# Patient Record
Sex: MALE | Race: WHITE | NOT HISPANIC OR LATINO | Employment: OTHER | URBAN - METROPOLITAN AREA
[De-identification: names, ages, dates, MRNs, and addresses within clinical notes are randomized per-mention and may not be internally consistent; named-entity substitution may affect disease eponyms.]

---

## 2021-03-15 ENCOUNTER — OFFICE VISIT (OUTPATIENT)
Dept: NEUROLOGY | Facility: CLINIC | Age: 71
End: 2021-03-15

## 2021-03-15 ENCOUNTER — LAB (OUTPATIENT)
Dept: LAB | Facility: HOSPITAL | Age: 71
End: 2021-03-15

## 2021-03-15 VITALS
WEIGHT: 242.2 LBS | DIASTOLIC BLOOD PRESSURE: 74 MMHG | SYSTOLIC BLOOD PRESSURE: 122 MMHG | HEART RATE: 78 BPM | OXYGEN SATURATION: 95 %

## 2021-03-15 DIAGNOSIS — E03.9 HYPOTHYROIDISM, UNSPECIFIED TYPE: ICD-10-CM

## 2021-03-15 DIAGNOSIS — E55.9 VITAMIN D DEFICIENCY: ICD-10-CM

## 2021-03-15 DIAGNOSIS — G20 PARKINSONISM, UNSPECIFIED PARKINSONISM TYPE (HCC): ICD-10-CM

## 2021-03-15 PROBLEM — F41.9 ANXIETY: Status: ACTIVE | Noted: 2021-03-15

## 2021-03-15 PROBLEM — J30.9 ALLERGIC RHINITIS: Status: ACTIVE | Noted: 2021-03-15

## 2021-03-15 PROBLEM — R25.1 TREMOR: Status: ACTIVE | Noted: 2021-03-15

## 2021-03-15 PROBLEM — M51.37 DEGENERATION OF LUMBAR/LUMBOSACRAL DISC WITHOUT MYELOPATHY: Status: ACTIVE | Noted: 2021-03-15

## 2021-03-15 PROBLEM — N40.0 BENIGN PROSTATIC HYPERPLASIA WITHOUT LOWER URINARY TRACT SYMPTOMS: Status: ACTIVE | Noted: 2021-03-15

## 2021-03-15 PROBLEM — G20.C PARKINSONISM: Status: ACTIVE | Noted: 2021-03-15

## 2021-03-15 LAB
25(OH)D3 SERPL-MCNC: 40.6 NG/ML (ref 30–100)
ALBUMIN SERPL-MCNC: 4 G/DL (ref 3.5–5.2)
ALBUMIN/GLOB SERPL: 1.5 G/DL
ALP SERPL-CCNC: 75 U/L (ref 39–117)
ALT SERPL W P-5'-P-CCNC: 16 U/L (ref 1–41)
AMMONIA BLD-SCNC: 26 UMOL/L (ref 16–60)
ANION GAP SERPL CALCULATED.3IONS-SCNC: 9.7 MMOL/L (ref 5–15)
AST SERPL-CCNC: 16 U/L (ref 1–40)
BASOPHILS # BLD AUTO: 0.03 10*3/MM3 (ref 0–0.2)
BASOPHILS NFR BLD AUTO: 0.6 % (ref 0–1.5)
BILIRUB SERPL-MCNC: 0.3 MG/DL (ref 0–1.2)
BUN SERPL-MCNC: 16 MG/DL (ref 8–23)
BUN/CREAT SERPL: 20 (ref 7–25)
CALCIUM SPEC-SCNC: 8.9 MG/DL (ref 8.6–10.5)
CHLORIDE SERPL-SCNC: 106 MMOL/L (ref 98–107)
CO2 SERPL-SCNC: 23.3 MMOL/L (ref 22–29)
CREAT SERPL-MCNC: 0.8 MG/DL (ref 0.76–1.27)
DEPRECATED RDW RBC AUTO: 43.3 FL (ref 37–54)
EOSINOPHIL # BLD AUTO: 0.23 10*3/MM3 (ref 0–0.4)
EOSINOPHIL NFR BLD AUTO: 4.7 % (ref 0.3–6.2)
ERYTHROCYTE [DISTWIDTH] IN BLOOD BY AUTOMATED COUNT: 13.8 % (ref 12.3–15.4)
ERYTHROCYTE [SEDIMENTATION RATE] IN BLOOD: 13 MM/HR (ref 0–20)
FOLATE SERPL-MCNC: 13.5 NG/ML (ref 4.78–24.2)
GFR SERPL CREATININE-BSD FRML MDRD: 95 ML/MIN/1.73
GLOBULIN UR ELPH-MCNC: 2.7 GM/DL
GLUCOSE SERPL-MCNC: 118 MG/DL (ref 65–99)
HCT VFR BLD AUTO: 40.6 % (ref 37.5–51)
HGB BLD-MCNC: 13.5 G/DL (ref 13–17.7)
IMM GRANULOCYTES # BLD AUTO: 0.01 10*3/MM3 (ref 0–0.05)
IMM GRANULOCYTES NFR BLD AUTO: 0.2 % (ref 0–0.5)
LYMPHOCYTES # BLD AUTO: 1.87 10*3/MM3 (ref 0.7–3.1)
LYMPHOCYTES NFR BLD AUTO: 38.4 % (ref 19.6–45.3)
MCH RBC QN AUTO: 28.8 PG (ref 26.6–33)
MCHC RBC AUTO-ENTMCNC: 33.3 G/DL (ref 31.5–35.7)
MCV RBC AUTO: 86.6 FL (ref 79–97)
MONOCYTES # BLD AUTO: 0.43 10*3/MM3 (ref 0.1–0.9)
MONOCYTES NFR BLD AUTO: 8.8 % (ref 5–12)
NEUTROPHILS NFR BLD AUTO: 2.3 10*3/MM3 (ref 1.7–7)
NEUTROPHILS NFR BLD AUTO: 47.3 % (ref 42.7–76)
NRBC BLD AUTO-RTO: 0 /100 WBC (ref 0–0.2)
PLATELET # BLD AUTO: 226 10*3/MM3 (ref 140–450)
PMV BLD AUTO: 9.2 FL (ref 6–12)
POTASSIUM SERPL-SCNC: 3.8 MMOL/L (ref 3.5–5.2)
PROT SERPL-MCNC: 6.7 G/DL (ref 6–8.5)
RBC # BLD AUTO: 4.69 10*6/MM3 (ref 4.14–5.8)
SODIUM SERPL-SCNC: 139 MMOL/L (ref 136–145)
TSH SERPL DL<=0.05 MIU/L-ACNC: 1.48 UIU/ML (ref 0.27–4.2)
VIT B12 BLD-MCNC: 567 PG/ML (ref 211–946)
WBC # BLD AUTO: 4.87 10*3/MM3 (ref 3.4–10.8)

## 2021-03-15 PROCEDURE — 82306 VITAMIN D 25 HYDROXY: CPT

## 2021-03-15 PROCEDURE — 36415 COLL VENOUS BLD VENIPUNCTURE: CPT

## 2021-03-15 PROCEDURE — 80053 COMPREHEN METABOLIC PANEL: CPT

## 2021-03-15 PROCEDURE — 82607 VITAMIN B-12: CPT

## 2021-03-15 PROCEDURE — 82140 ASSAY OF AMMONIA: CPT

## 2021-03-15 PROCEDURE — 86592 SYPHILIS TEST NON-TREP QUAL: CPT

## 2021-03-15 PROCEDURE — 84443 ASSAY THYROID STIM HORMONE: CPT

## 2021-03-15 PROCEDURE — 82390 ASSAY OF CERULOPLASMIN: CPT

## 2021-03-15 PROCEDURE — 99204 OFFICE O/P NEW MOD 45 MIN: CPT | Performed by: NURSE PRACTITIONER

## 2021-03-15 PROCEDURE — 85025 COMPLETE CBC W/AUTO DIFF WBC: CPT

## 2021-03-15 PROCEDURE — 86063 ANTISTREPTOLYSIN O SCREEN: CPT

## 2021-03-15 PROCEDURE — 85652 RBC SED RATE AUTOMATED: CPT

## 2021-03-15 PROCEDURE — 82746 ASSAY OF FOLIC ACID SERUM: CPT

## 2021-03-15 RX ORDER — METOPROLOL SUCCINATE 25 MG/1
TABLET, EXTENDED RELEASE ORAL
COMMUNITY
Start: 2021-01-23

## 2021-03-15 RX ORDER — ESCITALOPRAM OXALATE 10 MG/1
10 TABLET ORAL DAILY
COMMUNITY
Start: 2021-02-07

## 2021-03-15 RX ORDER — CALCIUM CARBONATE/VITAMIN D3 600 MG-10
TABLET ORAL
COMMUNITY

## 2021-03-15 RX ORDER — DOFETILIDE 0.5 MG/1
CAPSULE ORAL
COMMUNITY

## 2021-03-15 RX ORDER — LEVOTHYROXINE SODIUM 0.05 MG/1
50 TABLET ORAL DAILY
COMMUNITY
Start: 2021-03-01

## 2021-03-15 RX ORDER — CYANOCOBALAMIN 1000 UG/ML
INJECTION, SOLUTION INTRAMUSCULAR; SUBCUTANEOUS
COMMUNITY
Start: 2021-02-11

## 2021-03-15 RX ORDER — LANOLIN ALCOHOL/MO/W.PET/CERES
1000 CREAM (GRAM) TOPICAL DAILY
COMMUNITY
Start: 2021-02-11 | End: 2021-03-15

## 2021-03-15 RX ORDER — FEXOFENADINE HCL 180 MG/1
180 TABLET ORAL DAILY
COMMUNITY
Start: 2021-02-10

## 2021-03-15 RX ORDER — LOSARTAN POTASSIUM 100 MG/1
100 TABLET ORAL DAILY
COMMUNITY
Start: 2020-12-28

## 2021-03-15 RX ORDER — WARFARIN SODIUM 5 MG/1
TABLET ORAL
COMMUNITY
Start: 2021-03-01

## 2021-03-15 NOTE — PROGRESS NOTES
Subjective   Patient ID: Lalo Salazar is a 71 y.o. male     Chief Complaint   Patient presents with   • Tremors     NP        History of Present Illness  71 y.o. male referred by Dr. Henson for tremors.     Mild tremors for about 3-5 years, worsening over the past year.   Bilateral UE. Having difficulty with holding a cup of liquids, shaving, writing, and fine motor movements.     Denies difficulty rising from a chair, rolling over in bed, or vivid dreaming.     Forgetfulness, cant remember what tool he was going to get out of the shed.   Rembmers family members names, able to drive without getting lost, not leaving oven/stove on or water running. No trouble taking his medications.     MMSE 27 - did not know the date, can't spell word backwards and had trouble drawing the picture.    Has paroxysmal A-fib, has his Coumadin levels checked every 6 weeks. Recently changed administration instructions and is going every 2 weeks.     Medical Records reviewed:   OV 2/10/21: tremor worsening in hands at rest, can't hardly hold anything.     CBC - NCS     Past Medical History:   Diagnosis Date   • Atrial fibrillation (CMS/HCC)    • Bladder problem    • Bleeding disorder (CMS/HCC)    • History of angina      History reviewed. No pertinent family history.  Social History     Socioeconomic History   • Marital status:      Spouse name: Not on file   • Number of children: Not on file   • Years of education: Not on file   • Highest education level: Not on file   Tobacco Use   • Smoking status: Former Smoker   • Smokeless tobacco: Former User     Types: Chew     Quit date: 3/15/2005   • Tobacco comment: Quit 35+ years ago   Substance and Sexual Activity   • Alcohol use: Not Currently     Comment: Very occasional/rare glass of wine   • Drug use: Defer   • Sexual activity: Defer       Review of Systems    Objective     Vitals:    03/15/21 1417   BP: 122/74   Pulse: 78   SpO2: 95%   Weight: 110 kg (242 lb 3.2 oz)       Neurologic  Exam     Mental Status   Oriented to person, place, and time.   Disoriented to day.   Registration: recalls 3 of 3 objects. Recall at 5 minutes: recalls 3 of 3 objects. Follows 3 step commands.   Attention: normal. Concentration: normal.   Speech: speech is normal   Level of consciousness: alert  Knowledge: good and consistent with education.   Able to name object. Able to read. Able to repeat. Able to write. Normal comprehension.     Cranial Nerves     CN II   Visual fields full to confrontation.   Visual acuity: normal  Right visual field deficit: none  Left visual field deficit: none     CN III, IV, VI   Pupils are equal, round, and reactive to light.  Extraocular motions are normal.   Right pupil: Shape: regular. Reactivity: brisk. Consensual response: intact.   Left pupil: Shape: regular. Reactivity: brisk. Consensual response: intact.   Nystagmus: none   Diplopia: none  Ophthalmoparesis: none  Upgaze: normal  Downgaze: normal  Conjugate gaze: present  Vestibulo-ocular reflex: present    CN V   Facial sensation intact.   Right corneal reflex: normal  Left corneal reflex: normal    CN VII   Right facial weakness: none  Left facial weakness: none    CN VIII   Hearing: intact    CN IX, X   Palate: symmetric  Right gag reflex: normal  Left gag reflex: normal    CN XI   Right sternocleidomastoid strength: normal  Left sternocleidomastoid strength: normal    CN XII   Tongue: not atrophic  Fasciculations: absent  Tongue deviation: none    Motor Exam   Muscle bulk: normal  Overall muscle tone: normal  Right arm tone: normal  Left arm tone: normal  Right leg tone: normal  Left leg tone: normal    Strength   Strength 5/5 throughout.     Sensory Exam   Light touch normal.   Proprioception normal.     Gait, Coordination, and Reflexes     Coordination   Romberg: positive  Finger to nose coordination: normal  Tandem walking coordination: abnormal    Tremor   Resting tremor: present (BUE R > L )  Intention tremor:  absent  Action tremor: absent    Reflexes   Reflexes 2+ except as noted. Decreased R arm swing     No cogwheel or bradykinesia        Physical Exam  Eyes:      Extraocular Movements: EOM normal.      Pupils: Pupils are equal, round, and reactive to light.   Neurological:      Mental Status: He is oriented to person, place, and time.      Coordination: Romberg Test abnormal. Finger-Nose-Finger Test normal.      Gait: Tandem walk abnormal.      Deep Tendon Reflexes: Strength normal.   Psychiatric:         Speech: Speech normal.         No results found for any previous visit.         Assessment/Plan     Problem List Items Addressed This Visit        Endocrine and Metabolic    Hypothyroidism    Relevant Medications    levothyroxine (SYNTHROID, LEVOTHROID) 50 MCG tablet    metoprolol succinate XL (TOPROL-XL) 25 MG 24 hr tablet    Other Relevant Orders    TSH       Neuro    Parkinsonism (CMS/HCC)    Current Assessment & Plan     Ordered labs     Has a [facility] device, unsure if he can have MRI's - will bring his card to F/U     Many medications interact with Coumadin which limit medication options     Trial of Sinemet 25/100 PO BID     F/U in 6 weeks or sooner if needed          Relevant Medications    carbidopa-levodopa (Sinemet)  MG per tablet    Other Relevant Orders    Ammonia    CBC & Differential    Comprehensive Metabolic Panel    RPR    Sedimentation Rate    TSH    Vitamin B12 & Folate    Ceruloplasmin    Antistreptolysin O Screen    Vitamin D 25 Hydroxy      Other Visit Diagnoses     Vitamin D deficiency        Relevant Orders    Vitamin D 25 Hydroxy             Return in about 6 months (around 9/15/2021).

## 2021-03-15 NOTE — ASSESSMENT & PLAN NOTE
Ordered labs     Has a [facility] device, unsure if he can have MRI's - will bring his card to F/U     Many medications interact with Coumadin which limit medication options     Trial of Sinemet 25/100 PO BID     F/U in 6 weeks or sooner if needed

## 2021-03-16 LAB
ASO AB SERPL-ACNC: NEGATIVE [IU]/ML
CERULOPLASMIN SERPL-MCNC: 21 MG/DL (ref 16–31)
RPR SER QL: NORMAL

## 2021-04-30 ENCOUNTER — OFFICE VISIT (OUTPATIENT)
Dept: NEUROLOGY | Facility: CLINIC | Age: 71
End: 2021-04-30

## 2021-04-30 VITALS
BODY MASS INDEX: 28.34 KG/M2 | OXYGEN SATURATION: 97 % | SYSTOLIC BLOOD PRESSURE: 126 MMHG | WEIGHT: 240 LBS | HEIGHT: 77 IN | TEMPERATURE: 97.7 F | DIASTOLIC BLOOD PRESSURE: 70 MMHG | HEART RATE: 72 BPM

## 2021-04-30 DIAGNOSIS — G20 PARKINSONISM, UNSPECIFIED PARKINSONISM TYPE (HCC): Primary | ICD-10-CM

## 2021-04-30 PROCEDURE — 99213 OFFICE O/P EST LOW 20 MIN: CPT | Performed by: NURSE PRACTITIONER

## 2021-04-30 NOTE — PROGRESS NOTES
Subjective   Patient ID: Lalo Salazar is a 71 y.o. male     Chief Complaint   Patient presents with   • Tremors        History of Present Illness  71 y.o. male returns in follow up for Parkinsonism. At last appointment on 3/15/21 started Sinemet PO BID and ordered labs, MRI contraindicated with cardiac device.     Labs 3/15/21: CBC, CMP, Ammonia, RPR, Sed rate, TSH, Vitamin B12/Folate, ceruloplasmin, ASO, Vitamin D - NCS     Tremors improving during the morning time but worse in the afternoons, taking Sinemet PO BID. Denies side effects and dyskinesias.     Problem History:    Mild tremors for about 3-5 years, worsening over the past year.   Bilateral UE. Having difficulty with holding a cup of liquids, shaving, writing, and fine motor movements.     Denies difficulty rising from a chair, rolling over in bed, or vivid dreaming.     Forgetfulness, cant remember what tool he was going to get out of the shed.   Rembmers family members names, able to drive without getting lost, not leaving oven/stove on or water running. No trouble taking his medications.     MMSE 27 - did not know the date, can't spell word backwards and had trouble drawing the picture.    Has paroxysmal A-fib, has his Coumadin levels checked every 6 weeks. Recently changed administration instructions and is going every 2 weeks.     Medical Records reviewed:   OV 2/10/21: tremor worsening in hands at rest, can't hardly hold anything.     CBC - NCS     Past Medical History:   Diagnosis Date   • Atrial fibrillation (CMS/HCC)    • Bladder problem    • Bleeding disorder (CMS/HCC)    • History of angina      History reviewed. No pertinent family history.  Social History     Socioeconomic History   • Marital status:      Spouse name: Not on file   • Number of children: Not on file   • Years of education: Not on file   • Highest education level: Not on file   Tobacco Use   • Smoking status: Former Smoker   • Smokeless tobacco: Former User     Types:  "Chew     Quit date: 3/15/2005   • Tobacco comment: Quit 35+ years ago   Vaping Use   • Vaping Use: Never used   Substance and Sexual Activity   • Alcohol use: Not Currently     Comment: Very occasional/rare glass of wine   • Drug use: Defer   • Sexual activity: Defer       Review of Systems    Objective     Vitals:    04/30/21 1329   BP: 126/70   Pulse: 72   Temp: 97.7 °F (36.5 °C)   SpO2: 97%   Weight: 109 kg (240 lb)   Height: 195.6 cm (77\")       Neurologic Exam     Mental Status   Oriented to person, place, and time.   Disoriented to day.   Registration: recalls 3 of 3 objects. Recall at 5 minutes: recalls 3 of 3 objects. Follows 3 step commands.   Attention: normal. Concentration: normal.   Speech: speech is normal   Level of consciousness: alert  Knowledge: good and consistent with education.   Able to name object. Able to read. Able to repeat. Able to write. Normal comprehension.     Cranial Nerves     CN II   Visual fields full to confrontation.   Visual acuity: normal  Right visual field deficit: none  Left visual field deficit: none     CN III, IV, VI   Pupils are equal, round, and reactive to light.  Extraocular motions are normal.   Right pupil: Shape: regular. Reactivity: brisk. Consensual response: intact.   Left pupil: Shape: regular. Reactivity: brisk. Consensual response: intact.   Nystagmus: none   Diplopia: none  Ophthalmoparesis: none  Upgaze: normal  Downgaze: normal  Conjugate gaze: present  Vestibulo-ocular reflex: present    CN V   Facial sensation intact.   Right corneal reflex: normal  Left corneal reflex: normal    CN VII   Right facial weakness: none  Left facial weakness: none    CN VIII   Hearing: intact    CN IX, X   Palate: symmetric  Right gag reflex: normal  Left gag reflex: normal    CN XI   Right sternocleidomastoid strength: normal  Left sternocleidomastoid strength: normal    CN XII   Tongue: not atrophic  Fasciculations: absent  Tongue deviation: none    Motor Exam   Muscle " bulk: normal  Overall muscle tone: normal  Right arm tone: normal  Left arm tone: normal  Right leg tone: normal  Left leg tone: normal    Strength   Strength 5/5 throughout.     Sensory Exam   Light touch normal.   Proprioception normal.     Gait, Coordination, and Reflexes     Coordination   Romberg: positive  Finger to nose coordination: normal  Tandem walking coordination: abnormal    Tremor   Resting tremor: present (BUE R > L )  Intention tremor: absent  Action tremor: absent    Reflexes   Reflexes 2+ except as noted. Decreased R arm swing     No cogwheel or bradykinesia        Physical Exam  Eyes:      Extraocular Movements: EOM normal.      Pupils: Pupils are equal, round, and reactive to light.   Neurological:      Mental Status: He is oriented to person, place, and time.      Coordination: Romberg Test abnormal. Finger-Nose-Finger Test normal.      Gait: Tandem walk abnormal.      Deep Tendon Reflexes: Strength normal.   Psychiatric:         Speech: Speech normal.         Lab on 03/15/2021   Component Date Value Ref Range Status   • Ammonia 03/15/2021 26  16 - 60 umol/L Final   • Glucose 03/15/2021 118* 65 - 99 mg/dL Final   • BUN 03/15/2021 16  8 - 23 mg/dL Final   • Creatinine 03/15/2021 0.80  0.76 - 1.27 mg/dL Final   • Sodium 03/15/2021 139  136 - 145 mmol/L Final   • Potassium 03/15/2021 3.8  3.5 - 5.2 mmol/L Final   • Chloride 03/15/2021 106  98 - 107 mmol/L Final   • CO2 03/15/2021 23.3  22.0 - 29.0 mmol/L Final   • Calcium 03/15/2021 8.9  8.6 - 10.5 mg/dL Final   • Total Protein 03/15/2021 6.7  6.0 - 8.5 g/dL Final   • Albumin 03/15/2021 4.00  3.50 - 5.20 g/dL Final   • ALT (SGPT) 03/15/2021 16  1 - 41 U/L Final   • AST (SGOT) 03/15/2021 16  1 - 40 U/L Final   • Alkaline Phosphatase 03/15/2021 75  39 - 117 U/L Final   • Total Bilirubin 03/15/2021 0.3  0.0 - 1.2 mg/dL Final   • eGFR Non African Amer 03/15/2021 95  >60 mL/min/1.73 Final   • Globulin 03/15/2021 2.7  gm/dL Final   • A/G Ratio  03/15/2021 1.5  g/dL Final   • BUN/Creatinine Ratio 03/15/2021 20.0  7.0 - 25.0 Final   • Anion Gap 03/15/2021 9.7  5.0 - 15.0 mmol/L Final   • RPR 03/15/2021 Non-Reactive  Non-Reactive Final   • Sed Rate 03/15/2021 13  0 - 20 mm/hr Final   • TSH 03/15/2021 1.480  0.270 - 4.200 uIU/mL Final   • Folate 03/15/2021 13.50  4.78 - 24.20 ng/mL Final   • Vitamin B-12 03/15/2021 567  211 - 946 pg/mL Final   • Ceruloplasmin 03/15/2021 21  16 - 31 mg/dL Final   • ASO 03/15/2021 Negative  Negative Final   • 25 Hydroxy, Vitamin D 03/15/2021 40.6  30.0 - 100.0 ng/ml Final   • WBC 03/15/2021 4.87  3.40 - 10.80 10*3/mm3 Final   • RBC 03/15/2021 4.69  4.14 - 5.80 10*6/mm3 Final   • Hemoglobin 03/15/2021 13.5  13.0 - 17.7 g/dL Final   • Hematocrit 03/15/2021 40.6  37.5 - 51.0 % Final   • MCV 03/15/2021 86.6  79.0 - 97.0 fL Final   • MCH 03/15/2021 28.8  26.6 - 33.0 pg Final   • MCHC 03/15/2021 33.3  31.5 - 35.7 g/dL Final   • RDW 03/15/2021 13.8  12.3 - 15.4 % Final   • RDW-SD 03/15/2021 43.3  37.0 - 54.0 fl Final   • MPV 03/15/2021 9.2  6.0 - 12.0 fL Final   • Platelets 03/15/2021 226  140 - 450 10*3/mm3 Final   • Neutrophil % 03/15/2021 47.3  42.7 - 76.0 % Final   • Lymphocyte % 03/15/2021 38.4  19.6 - 45.3 % Final   • Monocyte % 03/15/2021 8.8  5.0 - 12.0 % Final   • Eosinophil % 03/15/2021 4.7  0.3 - 6.2 % Final   • Basophil % 03/15/2021 0.6  0.0 - 1.5 % Final   • Immature Grans % 03/15/2021 0.2  0.0 - 0.5 % Final   • Neutrophils, Absolute 03/15/2021 2.30  1.70 - 7.00 10*3/mm3 Final   • Lymphocytes, Absolute 03/15/2021 1.87  0.70 - 3.10 10*3/mm3 Final   • Monocytes, Absolute 03/15/2021 0.43  0.10 - 0.90 10*3/mm3 Final   • Eosinophils, Absolute 03/15/2021 0.23  0.00 - 0.40 10*3/mm3 Final   • Basophils, Absolute 03/15/2021 0.03  0.00 - 0.20 10*3/mm3 Final   • Immature Grans, Absolute 03/15/2021 0.01  0.00 - 0.05 10*3/mm3 Final   • nRBC 03/15/2021 0.0  0.0 - 0.2 /100 WBC Final         Assessment/Plan     Problem List Items  Addressed This Visit        Neuro    Parkinsonism (CMS/[facility]) - Primary    Current Assessment & Plan     Increase Sinemet to 25/100 PO TID     F/U in 6 months or sooner if needed          Relevant Medications    carbidopa-levodopa (Sinemet)  MG per tablet             Return in about 6 months (around 10/30/2021).

## 2021-05-17 ENCOUNTER — TELEPHONE (OUTPATIENT)
Dept: NEUROLOGY | Facility: CLINIC | Age: 71
End: 2021-05-17

## 2021-05-17 ENCOUNTER — APPOINTMENT (OUTPATIENT)
Dept: GENERAL RADIOLOGY | Facility: HOSPITAL | Age: 71
End: 2021-05-17

## 2021-05-17 ENCOUNTER — APPOINTMENT (OUTPATIENT)
Dept: CT IMAGING | Facility: HOSPITAL | Age: 71
End: 2021-05-17

## 2021-05-17 ENCOUNTER — HOSPITAL ENCOUNTER (EMERGENCY)
Facility: HOSPITAL | Age: 71
Discharge: HOME | End: 2021-05-17
Attending: EMERGENCY MEDICINE | Admitting: EMERGENCY MEDICINE

## 2021-05-17 VITALS
WEIGHT: 241 LBS | TEMPERATURE: 98.3 F | RESPIRATION RATE: 16 BRPM | HEART RATE: 76 BPM | DIASTOLIC BLOOD PRESSURE: 88 MMHG | HEIGHT: 77 IN | OXYGEN SATURATION: 93 % | SYSTOLIC BLOOD PRESSURE: 166 MMHG | BODY MASS INDEX: 28.46 KG/M2

## 2021-05-17 DIAGNOSIS — M25.50 ARTHRALGIA, UNSPECIFIED JOINT: Primary | ICD-10-CM

## 2021-05-17 DIAGNOSIS — Z86.69 HISTORY OF PARKINSON'S DISEASE: ICD-10-CM

## 2021-05-17 DIAGNOSIS — M79.10 MYALGIA: ICD-10-CM

## 2021-05-17 LAB
ALBUMIN SERPL-MCNC: 3.9 G/DL (ref 3.5–5.2)
ALBUMIN/GLOB SERPL: 1.3 G/DL
ALP SERPL-CCNC: 85 U/L (ref 39–117)
ALT SERPL W P-5'-P-CCNC: 19 U/L (ref 1–41)
ANION GAP SERPL CALCULATED.3IONS-SCNC: 12 MMOL/L (ref 5–15)
AST SERPL-CCNC: 27 U/L (ref 1–40)
BASOPHILS # BLD MANUAL: 0 10*3/MM3 (ref 0–0.2)
BASOPHILS NFR BLD AUTO: 0 % (ref 0–1.5)
BILIRUB SERPL-MCNC: 0.2 MG/DL (ref 0–1.2)
BILIRUB UR QL STRIP: NEGATIVE
BUN SERPL-MCNC: 17 MG/DL (ref 8–23)
BUN/CREAT SERPL: 16.7 (ref 7–25)
CALCIUM SPEC-SCNC: 9.6 MG/DL (ref 8.6–10.5)
CHLORIDE SERPL-SCNC: 104 MMOL/L (ref 98–107)
CLARITY UR: CLEAR
CO2 SERPL-SCNC: 23 MMOL/L (ref 22–29)
COLOR UR: YELLOW
CREAT SERPL-MCNC: 1.02 MG/DL (ref 0.76–1.27)
DEPRECATED RDW RBC AUTO: 46.6 FL (ref 37–54)
EOSINOPHIL # BLD MANUAL: 0.51 10*3/MM3 (ref 0–0.4)
EOSINOPHIL NFR BLD MANUAL: 4 % (ref 0.3–6.2)
ERYTHROCYTE [DISTWIDTH] IN BLOOD BY AUTOMATED COUNT: 14.4 % (ref 12.3–15.4)
GFR SERPL CREATININE-BSD FRML MDRD: 72 ML/MIN/1.73
GLOBULIN UR ELPH-MCNC: 2.9 GM/DL
GLUCOSE SERPL-MCNC: 108 MG/DL (ref 65–99)
GLUCOSE UR STRIP-MCNC: NEGATIVE MG/DL
HCT VFR BLD AUTO: 42.5 % (ref 37.5–51)
HGB BLD-MCNC: 13.8 G/DL (ref 13–17.7)
HGB UR QL STRIP.AUTO: NEGATIVE
HOLD SPECIMEN: NORMAL
INR PPP: 2.38 (ref 0.85–1.16)
KETONES UR QL STRIP: NEGATIVE
LEUKOCYTE ESTERASE UR QL STRIP.AUTO: NEGATIVE
LYMPHOCYTES # BLD MANUAL: 3.94 10*3/MM3 (ref 0.7–3.1)
LYMPHOCYTES NFR BLD MANUAL: 30 % (ref 19.6–45.3)
LYMPHOCYTES NFR BLD MANUAL: 7 % (ref 5–12)
MCH RBC QN AUTO: 28.7 PG (ref 26.6–33)
MCHC RBC AUTO-ENTMCNC: 32.5 G/DL (ref 31.5–35.7)
MCV RBC AUTO: 88.4 FL (ref 79–97)
MONOCYTES # BLD AUTO: 0.89 10*3/MM3 (ref 0.1–0.9)
MYELOCYTES NFR BLD MANUAL: 1 % (ref 0–0)
NEUTROPHILS # BLD AUTO: 7.24 10*3/MM3 (ref 1.7–7)
NEUTROPHILS NFR BLD MANUAL: 51 % (ref 42.7–76)
NEUTS BAND NFR BLD MANUAL: 6 % (ref 0–5)
NITRITE UR QL STRIP: NEGATIVE
PH UR STRIP.AUTO: <=5 [PH] (ref 5–8)
PLAT MORPH BLD: NORMAL
PLATELET # BLD AUTO: 187 10*3/MM3 (ref 140–450)
PMV BLD AUTO: 9.5 FL (ref 6–12)
POTASSIUM SERPL-SCNC: 4.3 MMOL/L (ref 3.5–5.2)
PROT SERPL-MCNC: 6.8 G/DL (ref 6–8.5)
PROT UR QL STRIP: NEGATIVE
PROTHROMBIN TIME: 25 SECONDS (ref 11.4–14.4)
RBC # BLD AUTO: 4.81 10*6/MM3 (ref 4.14–5.8)
RBC MORPH BLD: NORMAL
SODIUM SERPL-SCNC: 139 MMOL/L (ref 136–145)
SP GR UR STRIP: 1.01 (ref 1–1.03)
UROBILINOGEN UR QL STRIP: NORMAL
VARIANT LYMPHS NFR BLD MANUAL: 1 % (ref 0–5)
WBC # BLD AUTO: 12.71 10*3/MM3 (ref 3.4–10.8)
WBC MORPH BLD: NORMAL
WHOLE BLOOD HOLD SPECIMEN: NORMAL
WHOLE BLOOD HOLD SPECIMEN: NORMAL

## 2021-05-17 PROCEDURE — 85025 COMPLETE CBC W/AUTO DIFF WBC: CPT

## 2021-05-17 PROCEDURE — 81003 URINALYSIS AUTO W/O SCOPE: CPT | Performed by: PHYSICIAN ASSISTANT

## 2021-05-17 PROCEDURE — 85007 BL SMEAR W/DIFF WBC COUNT: CPT

## 2021-05-17 PROCEDURE — 80053 COMPREHEN METABOLIC PANEL: CPT

## 2021-05-17 PROCEDURE — 85610 PROTHROMBIN TIME: CPT | Performed by: PHYSICIAN ASSISTANT

## 2021-05-17 PROCEDURE — 70450 CT HEAD/BRAIN W/O DYE: CPT

## 2021-05-17 PROCEDURE — 71045 X-RAY EXAM CHEST 1 VIEW: CPT

## 2021-05-17 PROCEDURE — 99283 EMERGENCY DEPT VISIT LOW MDM: CPT

## 2021-05-17 RX ORDER — SODIUM CHLORIDE 0.9 % (FLUSH) 0.9 %
10 SYRINGE (ML) INJECTION AS NEEDED
Status: DISCONTINUED | OUTPATIENT
Start: 2021-05-17 | End: 2021-05-18 | Stop reason: HOSPADM

## 2021-05-17 NOTE — TELEPHONE ENCOUNTER
Provider:PEDRO MATSON  Caller: AMOS LAW  Relationship to Patient: PT'S WIFE  Ph[facility]    Reason for Call: PT'S WIFE CALLING STATING THAT THE PT'S BOTTOM LIP AND ALL THE WAY UNDER THE CHIN GETS NUMB OFF AND ON. PT IS COMPLAINING OF LOWER BACK AND LEGS BEING STIFF, CAUSE HE CAN'T GET OUT OF CHAIR WITH OUT HOLDING ON TO HER. THE NUMBNESS OF THE BOTTOM LIP AND UNDER CHIN AND STIFFNESS HAS BEEN GOING ON SINCE Friday. SHE STATES THAT SHE DIDN'T KNOW IF THE NUMBNESS HAS SOMETHING TO DO WITH THE PARKINSONS OR NOT.    When was the patient last seen: 4-  When did it start: Friday 14 MAY  Where is it located: BOTTOM LIP AND GOES UNDER CHIN  Characteristics of symptom/severity: LAST A FEW HOURS ON AND OFF  Timing- Is it constant or intermittent: INTERMITTENT, SEEMS TO BE MORE AT NIGHT.  What makes it worse: NO  What makes it better: NO  What therapies/medications have you tried: TRIED HEAT AND ICE PACK AND TYLENOL.        PLEASE CALL HER BACK -[phone]

## 2021-05-17 NOTE — TELEPHONE ENCOUNTER
Left a detailed vm for Lalo and then called wife, Tamica's # and recommended per Shraddha that he be evaluated at the ED. She is going to bring him up to [place] and take him to [facility] Emergency Department so that Shraddha can view his work up/records. Sounds good. Thanks!